# Patient Record
Sex: MALE | Race: WHITE | NOT HISPANIC OR LATINO | URBAN - METROPOLITAN AREA
[De-identification: names, ages, dates, MRNs, and addresses within clinical notes are randomized per-mention and may not be internally consistent; named-entity substitution may affect disease eponyms.]

---

## 2019-08-29 ENCOUNTER — OFFICE (OUTPATIENT)
Dept: URBAN - METROPOLITAN AREA CLINIC 11 | Facility: CLINIC | Age: 84
End: 2019-08-29
Payer: COMMERCIAL

## 2019-08-29 VITALS
HEIGHT: 68 IN | HEART RATE: 55 BPM | WEIGHT: 187 LBS | SYSTOLIC BLOOD PRESSURE: 157 MMHG | DIASTOLIC BLOOD PRESSURE: 88 MMHG

## 2019-08-29 DIAGNOSIS — R19.7 DIARRHEA, UNSPECIFIED: ICD-10-CM

## 2019-08-29 DIAGNOSIS — K92.1 MELENA: ICD-10-CM

## 2019-08-29 DIAGNOSIS — Z79.01 LONG TERM (CURRENT) USE OF ANTICOAGULANTS: ICD-10-CM

## 2019-08-29 DIAGNOSIS — I48.91 UNSPECIFIED ATRIAL FIBRILLATION: ICD-10-CM

## 2019-08-29 LAB
CBC, PLATELET, NO DIFFERENTIAL: HEMATOCRIT: 43.2 % (ref 37.5–51)
CBC, PLATELET, NO DIFFERENTIAL: HEMOGLOBIN: 14.7 G/DL (ref 13–17.7)
CBC, PLATELET, NO DIFFERENTIAL: MCH: 33.4 PG — HIGH (ref 26.6–33)
CBC, PLATELET, NO DIFFERENTIAL: MCHC: 34 G/DL (ref 31.5–35.7)
CBC, PLATELET, NO DIFFERENTIAL: MCV: 98 FL — HIGH (ref 79–97)
CBC, PLATELET, NO DIFFERENTIAL: NRBC: (no result)
CBC, PLATELET, NO DIFFERENTIAL: PLATELETS: 182 X10E3/UL (ref 150–450)
CBC, PLATELET, NO DIFFERENTIAL: RBC: 4.4 X10E6/UL (ref 4.14–5.8)
CBC, PLATELET, NO DIFFERENTIAL: RDW: 14.1 % (ref 12.3–15.4)
CBC, PLATELET, NO DIFFERENTIAL: WBC: 7.1 X10E3/UL (ref 3.4–10.8)

## 2019-08-29 PROCEDURE — 99204 OFFICE O/P NEW MOD 45 MIN: CPT | Performed by: INTERNAL MEDICINE

## 2019-08-29 RX ORDER — POLYETHYLENE GLYCOL 3350, SODIUM SULFATE, SODIUM CHLORIDE, POTASSIUM CHLORIDE, ASCORBIC ACID, SODIUM ASCORBATE 7.5-2.691G
KIT ORAL
Qty: 1 | Refills: 0 | Status: ACTIVE
Start: 2019-08-29

## 2019-08-29 NOTE — SERVICENOTES
We discussed his dark colored stools and diarrhea.  I suspect that this has been an issue with diarrhea and pepto but with his anticoauglation and lack of a colonoscopy in over 10 years, I would do both the EGD and colon for evlauation after his Cv clearance. We discussed the risks of bleeding on his meds, ER evaluation if he has worsening issues, and also risks of CVA off of the anticoagulation.

## 2019-08-29 NOTE — SERVICEHPINOTES
He stated that over the past month or so he has been having black stools.  The stools can be "liquidy and gassey".  He had been having about 4-5 stools over a day but it can vary.  He had some darker black stools initially but then started taking Peptobismol. He has not had abdominal pain.  Prior to a month ago he was having "normal stools once daily".  He has not had n/v, heartburn, reflux, dysphagia. He did have constipation in [place] for a few weeks.  Severam months ago he had abdominal pain and was seen in the ER at [facility].  The pain was in the LLQ and he was told that he had a renal stone.  He has not had other issues with abdominal pain.  He is on Eliquis for Atrial fibrillation.  He had stopped his Elquis yesterday.  He is followed by Dr. Gold and was seen last week but did not discuss his black stools then.BR